# Patient Record
Sex: FEMALE | Race: OTHER | URBAN - METROPOLITAN AREA
[De-identification: names, ages, dates, MRNs, and addresses within clinical notes are randomized per-mention and may not be internally consistent; named-entity substitution may affect disease eponyms.]

---

## 2024-01-11 ENCOUNTER — TRANSCRIBE ORDERS (OUTPATIENT)
Facility: HOSPITAL | Age: 44
End: 2024-01-11

## 2024-01-11 DIAGNOSIS — N63.0 MASS OF BREAST, UNSPECIFIED LATERALITY: Primary | ICD-10-CM

## 2024-02-02 ENCOUNTER — HOSPITAL ENCOUNTER (OUTPATIENT)
Facility: HOSPITAL | Age: 44
Discharge: HOME | End: 2024-02-02
Attending: SPECIALIST
Payer: COMMERCIAL

## 2024-02-02 ENCOUNTER — HOSPITAL ENCOUNTER (OUTPATIENT)
Facility: HOSPITAL | Age: 44
End: 2024-02-02
Attending: SPECIALIST
Payer: COMMERCIAL

## 2024-02-02 VITALS — HEIGHT: 59 IN | WEIGHT: 114.64 LBS | BODY MASS INDEX: 23.11 KG/M2

## 2024-02-02 DIAGNOSIS — N63.0 MASS OF BREAST, UNSPECIFIED LATERALITY: ICD-10-CM

## 2024-02-02 PROCEDURE — G0279 TOMOSYNTHESIS, MAMMO: HCPCS

## 2024-02-02 PROCEDURE — 76642 ULTRASOUND BREAST LIMITED: CPT

## 2024-02-13 ENCOUNTER — HOSPITAL ENCOUNTER (OUTPATIENT)
Facility: HOSPITAL | Age: 44
Discharge: HOME | End: 2024-02-16
Payer: COMMERCIAL

## 2024-02-13 DIAGNOSIS — N63.12 MASS OF UPPER INNER QUADRANT OF RIGHT BREAST: ICD-10-CM

## 2024-02-13 PROCEDURE — 2709999900 US BREAST BIOPSY W LOC DEVICE 1ST LESION RIGHT

## 2024-02-13 PROCEDURE — 77065 DX MAMMO INCL CAD UNI: CPT

## 2024-02-13 PROCEDURE — 88305 TISSUE EXAM BY PATHOLOGIST: CPT

## 2024-02-13 NOTE — PROGRESS NOTES
Patient tolerated procedure well.  Minimal bleeding noted.  Pressure held to biopsy site for 5 minutes.  Post biopsy discharge instructions reviewed with patient.  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  Work excuse provided.  Patient sent for post biopsy mammogram.  Dressing remained dry and intact.  Patient given a copy of discharge instructions and ice pack applied over transparent dressing.  Patient expects to be contacted by phone with pathology results.

## 2024-02-13 NOTE — PROGRESS NOTES
Patient received for right breast ultrasound guided biopsy.  Procedure explained to patient as well as risks associated with procedure.  Post biopsy discharge instructions reviewed with patient.  Patient prefers to be contacted by phone with pathology results.

## 2024-02-15 NOTE — PROGRESS NOTES
Pathology results in and reviewed by Dr Barbosa and faxed to Dr Torres's office with recommendation of surgical consult for excision.  We will assist with arranging surgical consult appointment.  Dr Barbosa spoke to the patient and she is aware of result.